# Patient Record
Sex: FEMALE | Race: AMERICAN INDIAN OR ALASKA NATIVE
[De-identification: names, ages, dates, MRNs, and addresses within clinical notes are randomized per-mention and may not be internally consistent; named-entity substitution may affect disease eponyms.]

---

## 2020-05-29 ENCOUNTER — HOSPITAL ENCOUNTER (EMERGENCY)
Dept: HOSPITAL 5 - ED | Age: 60
Discharge: HOME | End: 2020-05-29
Payer: MEDICAID

## 2020-05-29 VITALS — DIASTOLIC BLOOD PRESSURE: 72 MMHG | SYSTOLIC BLOOD PRESSURE: 110 MMHG

## 2020-05-29 DIAGNOSIS — J30.9: Primary | ICD-10-CM

## 2020-05-29 DIAGNOSIS — F17.220: ICD-10-CM

## 2020-05-29 DIAGNOSIS — Z86.73: ICD-10-CM

## 2020-05-29 DIAGNOSIS — R06.00: ICD-10-CM

## 2020-05-29 DIAGNOSIS — Z71.6: ICD-10-CM

## 2020-05-29 PROCEDURE — 71046 X-RAY EXAM CHEST 2 VIEWS: CPT

## 2020-05-29 NOTE — EMERGENCY DEPARTMENT REPORT
ED Shortness of Breath HPI





- General


Chief Complaint: Dyspnea/Respdistress


Stated Complaint: DIFFICULTY IN BREATHING


Time Seen by Provider: 20 20:52


Source: patient


Mode of arrival: Ambulatory


Limitations: No Limitations





- History of Present Illness


Initial Comments: 





60-year-old -American female presents to the emergency room for 3-day 

history of respiratory problems.  Patient reports that she has had this 

intermittently for years.  Patient does report that she has hyperthyroidism and 

had to have radioactive pill to help with her goiter.  Patient does report that 

she has a runny nose nasal congestion and cough but does not take her Zyrtec's 

like she is supposed to be.  Patient states that she has a primary care provider

at [facility].  She does admit to smoking.


MD Complaint: shortness of breath


Onset/Timing: 3


-: days(s)


Known History Of: other (Thyroid disease)


Context: allergen exposure


Associated Symptoms: cough, sputum production, other (Nasal congestion and runny

eyes)





- Related Data


Home Oxygen Therapy: No


                                    Allergies











Allergy/AdvReac Type Severity Reaction Status Date / Time


 


No Known Allergies Allergy   Unverified 20 20:10














ED Review of Systems


ROS: 


Stated complaint: DIFFICULTY IN BREATHING


Other details as noted in HPI





Comment: All other systems reviewed and negative





ED Past Medical Hx





- Past Medical History


Previous Medical History?: Yes


Hx CVA: Yes (TIA )


Additional medical history: Graves DZ





- Surgical History


Past Surgical History?: No





- Social History


Smoking Status: Current Some Day Smoker


Substance Use Type: None





ED Physical Exam





- General


Limitations: No Limitations


General appearance: alert, in no apparent distress





- Head


Head exam: Present: atraumatic, normocephalic





- Eye


Eye exam: Present: normal appearance





- ENT


ENT exam: Present: mucous membranes moist





- Neck


Neck exam: Present: normal inspection





- Respiratory


Respiratory exam: Present: normal lung sounds bilaterally.  Absent: respiratory 

distress





- Cardiovascular


Cardiovascular Exam: Present: regular rate, normal rhythm.  Absent: systolic 

murmur, diastolic murmur, rubs, gallop





- GI/Abdominal


GI/Abdominal exam: Present: soft, normal bowel sounds





- Extremities Exam


Extremities exam: Present: normal inspection





- Back Exam


Back exam: Present: normal inspection





- Neurological Exam


Neurological exam: Present: alert, oriented X3





- Psychiatric


Psychiatric exam: Present: normal affect, normal mood





- Skin


Skin exam: Present: warm, dry, intact, normal color.  Absent: rash





ED Course





                                   Vital Signs











  20





  20:07


 


Temperature 98.4 F


 


Pulse Rate 75


 


Respiratory 14





Rate 


 


Blood Pressure 105/75


 


O2 Sat by Pulse 99





Oximetry 














ED Medical Decision Making





- Radiology Data


Radiology results: report reviewed





Print Report





Referring Physician:ED DOCPatient Name:DARLIN GARCIAPatient 

ID:[id number] of Birth:[id number]:FemaleAccession:[id number] 

Date:[id number] Status:Finalized


Findings





[facility] 


[address] [place] [address] 





XRay Report 


Signed 





 Patient: DARLIN GARCIA MR#:  


 [id number] 


 : 1960 Acct:[id number] 





 Age/Sex: 60 / F ADM Date: 20 





 Loc: ED 


 Attending Dr: 








 Ordering Physician: TREY GEORGE MD 


 Date of Service: 20 


 Procedure(s): XR chest routine 2V 


 Accession Number(s): [id number] 





 cc: ED MD ARIEL 





 Fluoro Time In Minutes: 





 CHEST 2 VIEWS 





INDICATION / CLINICAL INFORMATION: 


GEO. 





COMPARISON: 


None available. 





FINDINGS: 





SUPPORT DEVICES: None. 


HEART / MEDIASTINUM: No significant abnormality. 


LUNGS / PLEURA: Lung volumes are increased bilaterally with no superimposed 

acute disease. No 


 pneumothorax. 





ADDITIONAL FINDINGS: No significant additional findings. 





IMPRESSION: 


1. No acute findings. 





Signer Name: Marquez Plata MD 


Signed: 2020 8:45 PM 


Workstation Name: [id number] 








 Transcribed By: [place] 


 Dictated By: Marquez Plata MD 


 Electronically Authenticated By: Marquez Plata MD 


 Signed Date/Time: 20 





- Medical Decision Making





60-year-old -American female presents to the emergency room for 3-day 

history of respiratory problems.  Patient reports that she has had this 

intermittently for years.  Patient does report that she has hyperthyroidism and 

had to have radioactive pill to help with her goiter.  Patient does report that 

she has a runny nose nasal congestion and cough but does not take her Zyrtec's 

like she is supposed to be.  Patient states that she has a primary care provider

at [facility].  She does admit to smoking.











Chest x-ray is negative for any acute abnormalities.  Long discussion with 

patient to take her allergy medications as this is most likely due to her 

allergies since she has runny nose nasal congestion sneezing and runny eyes.  

Also encourage patient to stop smoking as this will also help.  I discussed the 

patient to follow-up with her primary care provider if her symptoms persist or 

gets worse


Critical care attestation.: 


If time is entered above; I have spent that time in minutes in the direct care 

of this critically ill patient, excluding procedure time.








ED Disposition


Clinical Impression: 


 Tobacco abuse counseling, Tobacco abuse





Dyspnea


Qualifiers:


 Dyspnea type: unspecified Qualified Code(s): R06.00 - Dyspnea, unspecified





Allergic rhinitis


Qualifiers:


 Allergic rhinitis trigger: pollen Allergic rhinitis seasonality: seasonal 

Qualified Code(s): J30.1 - Allergic rhinitis due to pollen





Disposition: DC-01 TO HOME OR SELFCARE


Is pt being admited?: No


Does the pt Need Aspirin: No


Condition: Stable


Instructions:  Allergic Rhinitis (ED)


Additional Instructions: 


Please take your allergy medications as prescribed.  Your chest x-ray is 

negative for any acute abnormalities.  Follow-up with your primary care 

provider.


Referrals: 


[facility] [Outside] - 3-5 Days

## 2020-05-29 NOTE — XRAY REPORT
CHEST 2 VIEWS 



INDICATION / CLINICAL INFORMATION:

GEO.



COMPARISON: 

None available.



FINDINGS:



SUPPORT DEVICES: None.

HEART / MEDIASTINUM: No significant abnormality. 

LUNGS / PLEURA: Lung volumes are increased bilaterally with no superimposed acute disease. No pneumot
horax. 



ADDITIONAL FINDINGS: No significant additional findings.



IMPRESSION:

1. No acute findings.



Signer Name: Marquez Plata MD 

Signed: 5/29/2020 8:45 PM

Workstation Name: [id number]